# Patient Record
Sex: MALE | Race: OTHER | NOT HISPANIC OR LATINO | URBAN - METROPOLITAN AREA
[De-identification: names, ages, dates, MRNs, and addresses within clinical notes are randomized per-mention and may not be internally consistent; named-entity substitution may affect disease eponyms.]

---
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
## 2024-07-16 ENCOUNTER — APPOINTMENT (OUTPATIENT)
Dept: PEDIATRIC UROLOGY | Facility: CLINIC | Age: 19
End: 2024-07-16

## 2024-07-18 ENCOUNTER — APPOINTMENT (OUTPATIENT)
Dept: PEDIATRIC UROLOGY | Facility: CLINIC | Age: 19
End: 2024-07-18

## 2024-07-18 PROCEDURE — XXXXX: CPT

## 2024-07-24 ENCOUNTER — APPOINTMENT (OUTPATIENT)
Dept: PEDIATRIC UROLOGY | Facility: CLINIC | Age: 19
End: 2024-07-24
Payer: MEDICAID

## 2024-07-24 DIAGNOSIS — K59.00 CONSTIPATION, UNSPECIFIED: ICD-10-CM

## 2024-07-24 DIAGNOSIS — R35.1 NOCTURIA: ICD-10-CM

## 2024-07-24 DIAGNOSIS — R35.0 FREQUENCY OF MICTURITION: ICD-10-CM

## 2024-07-24 PROCEDURE — 99213 OFFICE O/P EST LOW 20 MIN: CPT

## 2024-07-24 NOTE — REASON FOR VISIT
[Home] : at home, [unfilled] , at the time of the visit. [Medical Office: ([facility])___] : at the medical office located in  [Follow-Up Visit] : a follow-up visit [Frequency] : frequency [Parents] : parents [Patient] : the patient [PCP] : ~pcp~

## 2024-07-24 NOTE — REASON FOR VISIT
[Home] : at home, [unfilled] , at the time of the visit. [Medical Office: ([facility])___] : at the medical office located in  [Follow-Up Visit] : a follow-up visit [Frequency] : frequency [Parents] : parents [Patient] : the patient [PCP] : ~pcp~

## 2024-07-24 NOTE — REASON FOR VISIT
[Home] : at home, [unfilled] , at the time of the visit. [Medical Office: ([facility])___] : at the medical office located in  [Follow-Up Visit] : a follow-up visit [Frequency] : frequency [Parents] : parents [Patient] : the patient [PCP] : ~pcp~

## 2024-07-27 NOTE — CONSULT LETTER
[FreeTextEntry1] : Dear Dr. LILIBETH DENNIS ,  I had the pleasure of seeing  CHIQUITA ESPARZA for follow up today.  Below is my note regarding the office visit today.  Thank you so very much for allowing me to participate in CHIQUITA's  care.  Please don't hesitate to call me should any questions or issues arise .  Sincerely,   Sam Mckee MD, FACS, [facility] Chief, Pediatric Urology Professor of Urology and Pediatrics [employer]  President, American Urological Association - [place] Section Past-President, Societies for Pediatric Urology

## 2024-07-27 NOTE — CONSULT LETTER
[FreeTextEntry1] : Dear Dr. LILIBETH DENNIS ,  I had the pleasure of seeing  CHIQUITA ESPARZA for follow up today.  Below is my note regarding the office visit today.  Thank you so very much for allowing me to participate in CHIQUITA's  care.  Please don't hesitate to call me should any questions or issues arise .  Sincerely,   Sam Mckee MD, FACS, [facility] Chief, Pediatric Urology Professor of Urology and Pediatrics [facility] [employer]  President, American Urological Association - [place] Section Past-President, Societies for Pediatric Urology

## 2024-07-27 NOTE — CONSULT LETTER
[FreeTextEntry1] : Dear Dr. LILIBETH DENNIS ,  I had the pleasure of seeing  CHIQUITA ESPARZA for follow up today.  Below is my note regarding the office visit today.  Thank you so very much for allowing me to participate in CHIQUITA's  care.  Please don't hesitate to call me should any questions or issues arise .  Sincerely,   Sam Mckee MD, FACS, [facility] Chief, Pediatric Urology Professor of Urology and Pediatrics [facility] [employer]  President, American Urological Association - [place] Section Past-President, Societies for Pediatric Urology

## 2024-07-27 NOTE — HISTORY OF PRESENT ILLNESS
[TextBox_4] : I verified the identity of the patient and the reason for the appointment with the parent. I explained to the parent that telemedicine encounters are not the same as a direct patient/healthcare provider visit because the patient and healthcare provider are not in the same room, which can result in limitations, including with the physical examination. I explained that the telemedicine encounter may require the patients genitalia to be shown. I explained that after the telemedicine encounter, the patient may require an office visit for an in-person physical examination, ultrasound, or other testing. I informed the parent that there may be privacy risks associated with the use of the technology and that there may be costs associated with the encounter. I offered the option of an office visit rather than a telemedicine encounter. Parent stated that all explanations were understood, and that all questions were answered to their satisfaction. The parent verbalized their preference and consent to proceed with the telemedicine encounter.  Edwin is an 19 year old male here for a follow up visit today. He was previously seen in-office in May 2021 for an isolated afebrile UTI. At his last visit (1/2023) he presented for complaints of urinary frequency that began 1 year prior.  Returns today for same complaint.  Voids 20+ times per day with immediate initiation of a continuous stream. Sensation of PVR. Reports that some voids he strains and little comes out. Has some dysuria with straining, denies hematuria. There is no difference between school days, weekends or vacation. Nocturia also present with 6 voids overnight. There is a moderate intake of bladder irritants in the diet. Denies additional UTI's. No incontinence, hematuria, dysuria, or other voiding complaints. Diagnosed with COVID in January 2021, prior to onset of symptoms. No known injuries to the kidneys or genital area. Reporting soft, daily bowel movements. No previous use of stool softeners/laxatives. Patient reports he was seen by an adult urologist two weeks ago for above symptoms. Renal/bladder ultrasound (7/18/24) was unremarkable other than rectal dilation of 3.6cm. Complains of occasional white discharge from penis with bowel movements, denies pruritis or pain. Started a few months ago.  Endorses being sexually active with one partner a few months ago, denies using protection.  No significant pmh, no psych history.

## 2024-07-27 NOTE — DATA REVIEWED
Yes
[FreeTextEntry1] : EXAMINATION: US RENAL AND PELVIS 7/18/24 FINDINGS: UNREMARKABLE KIDNEY AND PELVIC STRUCTURES RECTAL DIAMETER (3.6CM)

## 2024-07-27 NOTE — HISTORY OF PRESENT ILLNESS
[TextBox_4] : I verified the identity of the patient and the reason for the appointment with the parent. I explained to the parent that telemedicine encounters are not the same as a direct patient/healthcare provider visit because the patient and healthcare provider are not in the same room, which can result in limitations, including with the physical examination. I explained that the telemedicine encounter may require the patients genitalia to be shown. I explained that after the telemedicine encounter, the patient may require an office visit for an in-person physical examination, ultrasound, or other testing. I informed the parent that there may be privacy risks associated with the use of the technology and that there may be costs associated with the encounter. I offered the option of an office visit rather than a telemedicine encounter. Parent stated that all explanations were understood, and that all questions were answered to their satisfaction. The parent verbalized their preference and consent to proceed with the telemedicine encounter.  Edwin is an 19 year old male here for a follow up visit today. He was previously seen in-office in May 2021 for an isolated afebrile UTI. At his last visit (1/2023) he presented for complaints of urinary frequency that began 1 year prior.  Returns today for same complaint.  Voids 20+ times per day with immediate initiation of a continuous stream. Sensation of PVR. Reports that some voids he strains and little comes out. Has some dysuria with straining, denies hematuria. There is no difference between school days, weekends or vacation. Nocturia also present with 6 voids overnight. There is a moderate intake of bladder irritants in the diet. Denies additional UTI's. No incontinence, hematuria, dysuria, or other voiding complaints. Diagnosed with COVID in January 2021, prior to onset of symptoms. No known injuries to the kidneys or genital area. Reporting soft, daily bowel movements. No previous use of stool softeners/laxatives. Patient reports he was seen by an adult urologist two weeks ago for above symptoms. Renal/bladder ultrasound (7/18/24) was unremarkable other than rectal dilation of 3.6cm. Complains of occasional white discharge from penis with bowel movements, denies pruritis or pain. Started a few months ago.  Endorses being sexually active with one partner a few months ago, denies using protection.  No significant pmh, no psych history.  Alert and oriented to person, place, time/situation. normal mood and affect. no apparent risk to self or others.

## 2024-07-27 NOTE — ASSESSMENT
[FreeTextEntry1] : Edwin has urinary frequency, nocturia and history of constipation. RBUS on 7/18/24 unremarkable with rectal diameter 7/18/24. We discussed possible causes and contributors of this issue, as well as management options. The following plan was discussed:     - Will send urine sample to rule out hypercalciuria and UTI - Will send out urine for Gn/Chlamydia  - Decrease dietary bladder irritants  - Bowel management: Fiber and ExLAX  - EMG flow in 2-3 weeks - Physical exam to assess for meatal stenosis in 2-3 weeks - To transition to adult urology    Edwin verbalized understanding of the plan. All questions were addressed and answered to their satisfaction.

## 2024-08-26 ENCOUNTER — APPOINTMENT (OUTPATIENT)
Age: 19
End: 2024-08-26

## 2024-09-10 ENCOUNTER — APPOINTMENT (OUTPATIENT)
Dept: UROLOGY | Facility: CLINIC | Age: 19
End: 2024-09-10
Payer: MEDICAID

## 2024-09-10 VITALS
HEART RATE: 76 BPM | OXYGEN SATURATION: 95 % | DIASTOLIC BLOOD PRESSURE: 75 MMHG | SYSTOLIC BLOOD PRESSURE: 122 MMHG | RESPIRATION RATE: 16 BRPM

## 2024-09-10 PROCEDURE — 99204 OFFICE O/P NEW MOD 45 MIN: CPT

## 2024-09-10 NOTE — PHYSICAL EXAM
[General Appearance - Well Developed] : well developed [General Appearance - Well Nourished] : well nourished [Heart Rate And Rhythm] : heart rate and rhythm were normal [] : no respiratory distress [Abdomen Soft] : soft [Normal Station and Gait] : the gait and station were normal for the patient's age [Skin Color & Pigmentation] : normal skin color and pigmentation [No Focal Deficits] : no focal deficits [Oriented To Time, Place, And Person] : oriented to person, place, and time [Affect] : the affect was normal

## 2024-09-14 NOTE — HISTORY OF PRESENT ILLNESS
[Urinary Urgency] : urinary urgency [Urinary Frequency] : urinary frequency [Nocturia] : nocturia [None] : None [FreeTextEntry1] : : Mar 11 2005 Referring Provider: SELF,REFERRED   HPI:  Mr. CHIQUITA ESPARZA is a 19 year M with a PMHx notable for urinary frequency, dysuria with dehydration and overall full feeling - " never empty" since . Nocturia 3-4x; day frequency q1hr - Dr. Mckee tried laxatives - pt denies any constipation and didn't feel any difference. We discussed that this may also be a pelvis floor issue. He is avid athlete and performs calisthenics.  UA to be done- pt denies any blood in urine or UTIs    Anticoagulation: None All: NKDA PMHx: Scoliosis, urinary frequency  PSHx: None FHx: EVELYNE (mother) SocHx: Never smoker; no ETOH; student   [Urinary Incontinence] : no urinary incontinence [Urinary Retention] : no urinary retention [Straining] : no straining [Weak Stream] : no weak stream [Intermittency] : no intermittency [Post-Void Dribbling] : no post-void dribbling [Dysuria] : no dysuria [Hematuria - Gross] : no gross hematuria

## 2024-09-14 NOTE — ASSESSMENT
[FreeTextEntry1] : 19M here for urinary frequency  #Urinary Frequency -Pt to try PFPT and keep voiding diary with referral to functional urologist (Dr. Andriy Helms MD)  . Had previously seen Dr. Taylor, primarily related to lifestyle

## 2024-09-17 ENCOUNTER — APPOINTMENT (OUTPATIENT)
Dept: UROLOGY | Facility: CLINIC | Age: 19
End: 2024-09-17
Payer: MEDICAID

## 2024-09-17 VITALS
TEMPERATURE: 98.9 F | OXYGEN SATURATION: 98 % | RESPIRATION RATE: 16 BRPM | HEART RATE: 71 BPM | BODY MASS INDEX: 23.8 KG/M2 | DIASTOLIC BLOOD PRESSURE: 69 MMHG | SYSTOLIC BLOOD PRESSURE: 114 MMHG | WEIGHT: 170 LBS | HEIGHT: 71 IN

## 2024-09-17 DIAGNOSIS — N39.0 URINARY TRACT INFECTION, SITE NOT SPECIFIED: ICD-10-CM

## 2024-09-17 DIAGNOSIS — R35.0 FREQUENCY OF MICTURITION: ICD-10-CM

## 2024-09-17 PROCEDURE — 99214 OFFICE O/P EST MOD 30 MIN: CPT | Mod: 25

## 2024-09-17 PROCEDURE — 51798 US URINE CAPACITY MEASURE: CPT

## 2024-09-17 PROCEDURE — 51741 ELECTRO-UROFLOWMETRY FIRST: CPT

## 2024-09-18 LAB
APPEARANCE: CLEAR
BACTERIA: NEGATIVE /HPF
BILIRUBIN URINE: NEGATIVE
BLOOD URINE: NEGATIVE
CAST: 0 /LPF
COLOR: YELLOW
EPITHELIAL CELLS: 0 /HPF
GLUCOSE QUALITATIVE U: NEGATIVE MG/DL
KETONES URINE: NEGATIVE MG/DL
LEUKOCYTE ESTERASE URINE: NEGATIVE
MICROSCOPIC-UA: NORMAL
NITRITE URINE: NEGATIVE
PH URINE: 7.5
PROTEIN URINE: NEGATIVE MG/DL
RED BLOOD CELLS URINE: 0 /HPF
SPECIFIC GRAVITY URINE: 1.02
UROBILINOGEN URINE: 0.2 MG/DL
WHITE BLOOD CELLS URINE: 0 /HPF

## 2024-09-19 LAB
BACTERIA UR CULT: NORMAL
URINE CYTOLOGY: NORMAL

## 2024-11-11 ENCOUNTER — NON-APPOINTMENT (OUTPATIENT)
Age: 19
End: 2024-11-11

## 2024-12-06 NOTE — ED PEDIATRIC NURSE NOTE - CAPILLARY REFILL
2 seconds or less